# Patient Record
Sex: MALE | Race: BLACK OR AFRICAN AMERICAN | Employment: UNEMPLOYED | URBAN - METROPOLITAN AREA
[De-identification: names, ages, dates, MRNs, and addresses within clinical notes are randomized per-mention and may not be internally consistent; named-entity substitution may affect disease eponyms.]

---

## 2022-01-01 ENCOUNTER — HOSPITAL ENCOUNTER (EMERGENCY)
Age: 57
End: 2022-08-14
Attending: STUDENT IN AN ORGANIZED HEALTH CARE EDUCATION/TRAINING PROGRAM

## 2022-01-01 DIAGNOSIS — I46.9 CARDIAC ARREST (HCC): Primary | ICD-10-CM

## 2022-01-01 LAB — GLUCOSE BLD STRIP.AUTO-MCNC: 174 MG/DL (ref 70–110)

## 2022-01-01 PROCEDURE — 82962 GLUCOSE BLOOD TEST: CPT

## 2022-01-01 PROCEDURE — 74011250636 HC RX REV CODE- 250/636: Performed by: STUDENT IN AN ORGANIZED HEALTH CARE EDUCATION/TRAINING PROGRAM

## 2022-01-01 PROCEDURE — 96374 THER/PROPH/DIAG INJ IV PUSH: CPT

## 2022-01-01 PROCEDURE — 96375 TX/PRO/DX INJ NEW DRUG ADDON: CPT

## 2022-01-01 PROCEDURE — 74011000250 HC RX REV CODE- 250

## 2022-01-01 PROCEDURE — 96376 TX/PRO/DX INJ SAME DRUG ADON: CPT

## 2022-01-01 PROCEDURE — 99285 EMERGENCY DEPT VISIT HI MDM: CPT

## 2022-01-01 PROCEDURE — 74011000250 HC RX REV CODE- 250: Performed by: STUDENT IN AN ORGANIZED HEALTH CARE EDUCATION/TRAINING PROGRAM

## 2022-01-01 PROCEDURE — 74011250636 HC RX REV CODE- 250/636

## 2022-01-01 RX ORDER — NALOXONE HYDROCHLORIDE 0.4 MG/ML
INJECTION, SOLUTION INTRAMUSCULAR; INTRAVENOUS; SUBCUTANEOUS
Status: DISCONTINUED
Start: 2022-01-01 | End: 2022-01-01 | Stop reason: HOSPADM

## 2022-01-01 RX ORDER — SODIUM BICARBONATE 1 MEQ/ML
50 SYRINGE (ML) INTRAVENOUS ONCE
Status: COMPLETED | OUTPATIENT
Start: 2022-01-01 | End: 2022-01-01

## 2022-01-01 RX ORDER — NALOXONE HYDROCHLORIDE 1 MG/ML
INJECTION INTRAMUSCULAR; INTRAVENOUS; SUBCUTANEOUS
Status: DISCONTINUED
Start: 2022-01-01 | End: 2022-01-01 | Stop reason: WASHOUT

## 2022-01-01 RX ORDER — CALCIUM CHLORIDE INJECTION 100 MG/ML
1 INJECTION, SOLUTION INTRAVENOUS ONCE
Status: COMPLETED | OUTPATIENT
Start: 2022-01-01 | End: 2022-01-01

## 2022-01-01 RX ORDER — EPINEPHRINE 1 MG/ML
0.1 INJECTION, SOLUTION, CONCENTRATE INTRAVENOUS
Status: DISCONTINUED | OUTPATIENT
Start: 2022-01-01 | End: 2022-01-01 | Stop reason: CLARIF

## 2022-01-01 RX ORDER — EPINEPHRINE 0.1 MG/ML
1 INJECTION INTRACARDIAC; INTRAVENOUS
Status: COMPLETED | OUTPATIENT
Start: 2022-01-01 | End: 2022-01-01

## 2022-01-01 RX ORDER — EPINEPHRINE 1 MG/ML
0.1 INJECTION, SOLUTION, CONCENTRATE INTRAVENOUS
Status: DISCONTINUED | OUTPATIENT
Start: 2022-01-01 | End: 2022-01-01 | Stop reason: HOSPADM

## 2022-01-01 RX ORDER — EPINEPHRINE 1 MG/ML
1 INJECTION, SOLUTION, CONCENTRATE INTRAVENOUS
Status: DISCONTINUED | OUTPATIENT
Start: 2022-01-01 | End: 2022-01-01 | Stop reason: HOSPADM

## 2022-01-01 RX ORDER — NALOXONE HYDROCHLORIDE 1 MG/ML
INJECTION INTRAMUSCULAR; INTRAVENOUS; SUBCUTANEOUS
Status: COMPLETED
Start: 2022-01-01 | End: 2022-01-01

## 2022-01-01 RX ORDER — EPINEPHRINE 1 MG/ML
0.1 INJECTION, SOLUTION, CONCENTRATE INTRAVENOUS ONCE
Status: DISCONTINUED | OUTPATIENT
Start: 2022-01-01 | End: 2022-01-01 | Stop reason: CLARIF

## 2022-01-01 RX ORDER — SODIUM BICARBONATE 1 MEQ/ML
SYRINGE (ML) INTRAVENOUS
Status: COMPLETED
Start: 2022-01-01 | End: 2022-01-01

## 2022-01-01 RX ADMIN — SODIUM BICARBONATE 50 MEQ: 84 INJECTION, SOLUTION INTRAVENOUS at 12:20

## 2022-01-01 RX ADMIN — Medication 50 MEQ: at 12:25

## 2022-01-01 RX ADMIN — EPINEPHRINE 1 MG: 0.1 INJECTION INTRACARDIAC; INTRAVENOUS at 12:11

## 2022-01-01 RX ADMIN — SODIUM BICARBONATE 50 MEQ: 84 INJECTION, SOLUTION INTRAVENOUS at 12:25

## 2022-01-01 RX ADMIN — EPINEPHRINE 1 MG: 0.1 INJECTION INTRACARDIAC; INTRAVENOUS at 12:19

## 2022-01-01 RX ADMIN — CALCIUM CHLORIDE 1 G: 100 INJECTION, SOLUTION INTRAVENOUS; INTRAVENTRICULAR at 12:17

## 2022-01-01 RX ADMIN — NALOXONE HYDROCHLORIDE 2 MG: 1 INJECTION PARENTERAL at 12:14

## 2022-01-01 RX ADMIN — EPINEPHRINE 1 MG: 0.1 INJECTION INTRACARDIAC; INTRAVENOUS at 12:15

## 2022-08-14 NOTE — ED TRIAGE NOTES
Pt arrived in full cardiac arrest via EMS transport [facility] CORTES Medic 5) with [employer] device in place performing compressions. Per EMS:    Pt was found by neighbor unresponsive. Last communication with pt was 20 min prior to neighbor finding him. EMS was called and was on scene performing compressions 10 min and 5 min en route. They admin narcan pta along with 3 epi, atropine, and bicarb. Fsbs was 209 en route. 18 ga in place LAC. Ems had no demo information. Name was obtained from pt wallet. EMS unsure if pt family otw.

## 2022-08-14 NOTE — PROGRESS NOTES
responded to Death of  Santi Duckworth, who was a 65 y.o.,male. The  provided the following Interventions:  Provided a supportive compassionate presence to Mr. Tiffanie Fernandez family before, during and after their visiting his bedside. Offered guidance concerning next steps and then provided support as the physician visited with the family surrounding the decision to make Mr. Tiffanie Fernandez a medical examiner case. Offered prayers on behalf of the patient and the family and offered assurance of additional prayers for the family as they work through their grief. Chart reviewed. Plan: The family will contact the [facility] ER after they have made a decision on a  home and understand that he will be transported to the [facility] in the meantime.         [address] [place]   [phone]

## 2022-08-14 NOTE — ED NOTES
Family at bedside with  viewing patient. [employer] was called and left a message to see if patient is donor eligible.

## 2022-08-14 NOTE — ED NOTES
Jessica Handing remains in place performing compressions. Cardiac monitor in place. Provider remains at bedside. Remains intubated. Lung sounds checked via auscultation.

## 2022-08-15 NOTE — ED PROVIDER NOTES
EMERGENCY DEPARTMENT HISTORY AND PHYSICAL EXAM      Date: 8/14/2022  Patient Name: Rupal Mcdonald    History of Presenting Illness     Chief Complaint   Patient presents with    Cardiac arrest     Unwitnessed arrest       HPI:  Rupal Mcdonald is a 65 y.o. male with an unknown medical history found by neighbor unresponsive, not breathing. Last communication with patient was 20 min prior to neighbor finding him not breathing. Arrived in PEA (IVR) shelley in place. EMS was called and was on scene performing compressions after approximately 10 min and 5 minutes while en route. Patient was given 2mg narcan, atropine, 3 mg EPI, 1 amp of bicarb. Glucose was 209    Bystander on scene ? Possible Aunt stated patient has history of substance use, unknown substance, may be Marijuana    18 ga IV in [address]  Name was found on drivers license. No family on scene, they had gone to [place]    PCP: None    Past History     Past Medical History:  No past medical history on file. Past Surgical History:  No past surgical history on file. Family History:  No family history on file. Social History: Allergies:  No Known Allergies    PMH, PSH, family history, social history, allergies reviewed with the patient with significant items noted above. Review of Systems   History cannot be obtained, as patient in cardiac arrest and nonverbal.    Physical Exam     Vitals:    08/14/22 1209 08/14/22 1213 08/14/22 1219 08/14/22 1222   Pulse: (!) 0 (!) 0 (!) 0 (!) 0   Resp:  (!) 0 (!) 0    SpO2:  (!) 0% (!) 0%      Gen: Obtunded  Airway: Intubated, verified with waveform capnography  Breathing: Bilateral breath sounds. Circulation: Chest compressions in progress, ETCO2 adequate  Disability: Pupils not reactive, Corneal reflex abscent  Body: No obvious signs of trauma. Diagnostic Study Results     Labs -   No results found for this or any previous visit (from the past 12 hour(s)).     Radiologic Studies -   No orders to display CT Results  (Last 48 hours)      None          CXR Results  (Last 48 hours)      None          Medical Decision Making   I am the first provider for this patient. MDM:   Patient presents with cardiac arrest.  Exam significant for CPR in progress, tube verified in appropriate position, neuro exam, no pupil response, no response to painful stimuli, no spontaneous movement. DDX considered: Hs and Ts, acute coronary syndrome, intracranial pathology, arrhythmia    Patient condition on initial evaluation: Critically ill    Plan:   ACLS  See nursing notes for timeline and mar. Multiple rounds of ACLS completed, CPR was not interrrupted at any time for more than 10 seconds. Adequate bedside US windows unfortunately showed no organized cardiac rhythm, no large effusion or other pathology. CPR and ACLS was continue for 22 minutes after arrival.     Orders as below:  Orders Placed This Encounter    CRITICAL CARE (ASAP ONLY)    GLUCOSE, POC    DISCONTD: naloxone (NARCAN) 0.4 mg/mL injection    naloxone (NARCAN) 1 mg/mL injection    sodium bicarbonate 8.4 % (1 mEq/mL) injection 50 mEq    DISCONTD: EPINEPHrine HCl (PF) (ADRENALIN) 1 mg/mL (1 mL) injection 1 mg    DISCONTD: EPINEPHrine HCl (PF) (ADRENALIN) 1 mg/mL (1 mL) injection 0.1 mg    DISCONTD: EPINEPHrine HCl (PF) (ADRENALIN) 1 mg/mL (1 mL) injection 0.1 mg    DISCONTD: EPINEPHrine HCl (PF) (ADRENALIN) 1 mg/mL (1 mL) injection 0.1 mg    EPINEPHrine (ADRENALIN) 0.1 mg/mL syringe 1 mg    EPINEPHrine (ADRENALIN) 0.1 mg/mL syringe 1 mg    calcium chloride injection 1 g    DISCONTD: EPINEPHrine HCl (PF) (ADRENALIN) 1 mg/mL (1 mL) injection 0.1 mg    EPINEPHrine (ADRENALIN) 0.1 mg/mL syringe 1 mg    DISCONTD: naloxone (NARCAN) 1 mg/mL injection    sodium bicarbonate 8.4 % (1 mEq/mL) injection        ED Course:   See initial part of course above.      Pt presents to ED via EMS in cardiac arrest  Limited hx available about events leading up to present illness, patient unable to provide any history. Time from loss of consciousness to CPR:  estimated as much as 35 minutes to arrival.   Initial rhythm:  PEA and asystole per ems    Spoke with the patient's daughter, next of kin, and others in family area with nurse and  was en route to assist.      ED CPR Note    Basic CPR performed?: yes  Performed by: shelley, supervised by EP      Physical Exam:  Gen: unresponsive, appears acutely ill  Neuro: does not participate in neuro exam  CV: no palpable pulses  Resp: equal breath sounds though no spontaneous resp  Abd: Soft, nondistended  Musc: no palpable pulses, no clear evidence of trauma    Unable to perform ROS due to acuity of condition     ED Course as of 22 1454   Sun Aug 14, 2022   1338 Death investigator Sarah Hurtado  Will take him as a case. [phone]   [DM]   6900 TOD 1226 [DM]      ED Course User Index  [DM] Jessica Restrepo MD     Discussed with family, will need more time for  home arrangement, patient will have to be transferred to [facility]      Critical Care    Date/Time: 2022 7:47 PM  Performed by: Jessica Restrepo MD  Authorized by: Jessica Restrepo MD     Critical care provider statement:     Critical care time (minutes):  35    Critical care time was exclusive of:  Separately billable procedures and treating other patients    Critical care was necessary to treat or prevent imminent or life-threatening deterioration of the following conditions:  Cardiac failure    Critical care was time spent personally by me on the following activities:  Evaluation of patient's response to treatment, examination of patient, re-evaluation of patient's condition, pulse oximetry, review of old charts, obtaining history from patient or surrogate, ordering and review of radiographic studies and ordering and performing treatments and interventions (monitoring for potential decompensation)      Services included the following:  -reviewing nursing notes and old charts  -vital sign assessments  -direct patient care  -medication orders and management  -interpreting and reviewing diagnostic studies/labs  -re-evaluations  -documentation time    Disposition: Discharge is   TOD of death noted in ED Course    Diagnosis     Clinical Impression:   1. Cardiac arrest [facility])        It should be noted that I will be the provider of record for this patient  Laura Sinha MD    Follow-up Information    None         There are no discharge medications for this patient. Please note that this dictation was completed with Blackbook[employer], the computer voice recognition software. Quite often unanticipated grammatical, syntax, homophones, and other interpretive errors are inadvertently transcribed by the computer software. Please disregard these errors. Please excuse any errors that have escaped final proofreading.